# Patient Record
(demographics unavailable — no encounter records)

---

## 2024-11-12 NOTE — PLAN
[FreeTextEntry1] : Differential diagnosis, work up ,management and evaluation of above mentioned concerns extensively reviewed Extensive review of above mentioned concerns. All questions and concerns addressed, patient expressed understanding. Encouraged to contact the office with any questions or concerns.   .

## 2024-11-12 NOTE — HISTORY OF PRESENT ILLNESS
[FreeTextEntry1] : menopausal disorder episodic dull pelvic pain for 3-4 mos-not aggrav or alleviated by anything specific--lasting not very long.Spont resolvesTAH/USO in  by Dr. Doyle Sr for large myomata Currently not in pain P1, [Mammogramdate] : 2024 [ColonoscopyDate] : 2022 [Currently Active] : currently active

## 2024-11-12 NOTE — PHYSICAL EXAM
[Chaperone Present] : A chaperone was present in the examining room during all aspects of the physical examination [FreeTextEntry2] : Kimmy Briones [Appropriately responsive] : appropriately responsive [Alert] : alert [No Acute Distress] : no acute distress [No Lymphadenopathy] : no lymphadenopathy [Soft] : soft [Non-tender] : non-tender [Non-distended] : non-distended [No HSM] : No HSM [No Lesions] : no lesions [No Mass] : no mass [Oriented x3] : oriented x3 [Examination Of The Breasts] : a normal appearance [No Masses] : no breast masses were palpable [Labia Majora] : normal [Labia Minora] : normal [Normal] : normal [Absent] : absent [Uterine Adnexae] : non-palpable

## 2024-11-15 NOTE — IMAGING
[de-identified] : LSPINE Inspection: No rash or ecchymosis Palpation: No tenderness to palpation or spasm in bilateral thoracic and lumbar paraspinal musculature, no SI joint tenderness to palpation ROM: Full with no pain Strength: 5/5 bilateral hip flexors, knee extensors, ankle dorsiflexors, EHL, ankle plantarflexors.  Sensation: Sensation present to light touch bilateral L2-S1 distributions Provocative maneuvers: +SLR RIGHT,  Negative left straight leg raise  Bilateral hips- Palpation: No tenderness to palpation over greater trochanter or IT band ROM: No pain with flexion and internal rotation

## 2024-11-15 NOTE — HISTORY OF PRESENT ILLNESS
[de-identified] : Pt seen by non-spine partners in the past  10/12/2024 [facility] Lumbar MRI  - report noted in chart.  Discs: There is degenerative decreased T2 disc signal from L2-3 through L5-S1 with disc space are most pronounced at L4-5 T12-L1: There is a right paracentral disc herniation without significant central canal stenosis. L1-2: There is no significant posterior disc abnormality, spinal canal or foraminal stenosis. L2-3: There is diffuse diffuse disc bulging and small left foraminal disc herniation resulting in mild bilateral foraminal narrowing L3-4: There is diffuse diffuse disc bulging and facet arthropathy resulting in moderate bilateral foraminal narrowing L4-5: There is diffuse diffuse disc bulging resulting in moderate bilateral foraminal narrowing L5-S1: There is diffuse diffuse disc bulging impinging the descending right S1 nerve roots with moderate left mild right foraminal narrowing Ind. 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 Patient reports pain down the RLE>LLE 70/30 for about 2 months; occasional N/T. Cramping in the posterior thigh to leg to foot.  No bb dysfunction.

## 2024-11-15 NOTE — ASSESSMENT
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  Advised to clear medication with internist or PCP if any concurrent health problem with heart, blood pressure, or GI system exists. Gabapentin- Patient advised of sedating effects, instructed not to drive, operate machinery, or take with other sedating medications. Advised of need to taper on/off medication and risk of abruptly stopping gabapentin.   Entered by Earlene Rdedy acting as scribe. Dr. Luna- The documentation recorded by the scribe accurately reflects the service I personally performed and the decisions made by me.

## 2025-01-30 NOTE — HEALTH RISK ASSESSMENT
[Good] : ~his/her~  mood as  good [No] : In the past 12 months have you used drugs other than those required for medical reasons? No [No falls in past year] : Patient reported no falls in the past year [0] : 2) Feeling down, depressed, or hopeless: Not at all (0) [Never] : Never [NO] : No [Patient reported mammogram was normal] : Patient reported mammogram was normal [Patient reported PAP Smear was normal] : Patient reported PAP Smear was normal [Patient reported bone density results were abnormal] : Patient reported bone density results were abnormal [Patient reported colonoscopy was abnormal] : Patient reported colonoscopy was abnormal [HIV test declined] : HIV test declined [None] : None [Feels Safe at Home] : Feels safe at home [Fully functional (bathing, dressing, toileting, transferring, walking, feeding)] : Fully functional (bathing, dressing, toileting, transferring, walking, feeding) [Fully functional (using the telephone, shopping, preparing meals, housekeeping, doing laundry, using] : Fully functional and needs no help or supervision to perform IADLs (using the telephone, shopping, preparing meals, housekeeping, doing laundry, using transportation, managing medications and managing finances) [Smoke Detector] : smoke detector [Carbon Monoxide Detector] : carbon monoxide detector [Seat Belt] :  uses seat belt [Sunscreen] : uses sunscreen [With Patient/Caregiver] : , with patient/caregiver [FreeTextEntry1] : Check up  [de-identified] : ORTHOGYN [[id number]] : 0 [Change in mental status noted] : No change in mental status noted [Reports changes in hearing] : Reports no changes in hearing [Reports changes in vision] : Reports no changes in vision [Reports changes in dental health] : Reports no changes in dental health [MammogramDate] : 01/24 [PapSmearDate] : 11/18 [BoneDensityDate] : 09/22 [ColonoscopyDate] : 05/22 [HepatitisCComments] : Negative [HepatitisCDate] : 05/18 [AdvancecareDate] : 01/25

## 2025-01-30 NOTE — HISTORY OF PRESENT ILLNESS
[de-identified] : 68 year old female patient with history of stable Hypercholesterolemia, Vitamin D Deficiency, Chronic Migraine, Vertigo, history as stated, presented for an annual preventative examination.

## 2025-01-30 NOTE — ASSESSMENT
[FreeTextEntry1] : 68 year old female found to have stable Hypercholesterolemia, Vitamin D Deficiency, Chronic Migraine, Vertigo, with the current prescription regimen as recommended, diet and lifestyle modifications, as counseled. Prior results reviewed, interpreted and discussed with the patient during today's examination, as appropriate. Follow up, treatment plan and tests, as ordered.  Having a persistently abnormal lymphocyte count can be associated with various benign and abnormal conditions, one of the latter being monoclonal B-cell lymphocytosis (MBL). MBL is a precursor state of chronic lymphocytic leukemia (CLL), HEMEONC F/U for further assessment and possible appropriate surveillance, as counseled.   EKG: Sinus Rhythm @ 65 BPM. Known LAD, no new acute ST-T changes noted.

## 2025-01-30 NOTE — HEALTH RISK ASSESSMENT
[Good] : ~his/her~  mood as  good [No] : In the past 12 months have you used drugs other than those required for medical reasons? No [No falls in past year] : Patient reported no falls in the past year [0] : 2) Feeling down, depressed, or hopeless: Not at all (0) [Never] : Never [NO] : No [Patient reported mammogram was normal] : Patient reported mammogram was normal [Patient reported PAP Smear was normal] : Patient reported PAP Smear was normal [Patient reported bone density results were abnormal] : Patient reported bone density results were abnormal [Patient reported colonoscopy was abnormal] : Patient reported colonoscopy was abnormal [HIV test declined] : HIV test declined [None] : None [Feels Safe at Home] : Feels safe at home [Fully functional (bathing, dressing, toileting, transferring, walking, feeding)] : Fully functional (bathing, dressing, toileting, transferring, walking, feeding) [Fully functional (using the telephone, shopping, preparing meals, housekeeping, doing laundry, using] : Fully functional and needs no help or supervision to perform IADLs (using the telephone, shopping, preparing meals, housekeeping, doing laundry, using transportation, managing medications and managing finances) [Smoke Detector] : smoke detector [Carbon Monoxide Detector] : carbon monoxide detector [Seat Belt] :  uses seat belt [Sunscreen] : uses sunscreen [With Patient/Caregiver] : , with patient/caregiver [FreeTextEntry1] : Check up  [de-identified] : ORTHOGYN [[id number]] : 0 [Change in mental status noted] : No change in mental status noted [Reports changes in hearing] : Reports no changes in hearing [Reports changes in vision] : Reports no changes in vision [Reports changes in dental health] : Reports no changes in dental health [MammogramDate] : 01/24 [PapSmearDate] : 11/18 [BoneDensityDate] : 09/22 [ColonoscopyDate] : 05/22 [HepatitisCComments] : Negative [HepatitisCDate] : 05/18 [AdvancecareDate] : 01/25

## 2025-01-30 NOTE — HISTORY OF PRESENT ILLNESS
[de-identified] : 68 year old female patient with history of stable Hypercholesterolemia, Vitamin D Deficiency, Chronic Migraine, Vertigo, history as stated, presented for an annual preventative examination.

## 2025-02-04 NOTE — HISTORY OF PRESENT ILLNESS
[de-identified] : Pt seen by non-spine partners in the past  10/12/2024 [facility] Lumbar MRI  - report noted in chart.  Discs: There is degenerative decreased T2 disc signal from L2-3 through L5-S1 with disc space are most pronounced at L4-5 T12-L1: There is a right paracentral disc herniation without significant central canal stenosis. L1-2: There is no significant posterior disc abnormality, spinal canal or foraminal stenosis. L2-3: There is diffuse diffuse disc bulging and small left foraminal disc herniation resulting in mild bilateral foraminal narrowing L3-4: There is diffuse diffuse disc bulging and facet arthropathy resulting in moderate bilateral foraminal narrowing L4-5: There is diffuse diffuse disc bulging resulting in moderate bilateral foraminal narrowing L5-S1: There is diffuse diffuse disc bulging impinging the descending right S1 nerve roots with moderate left mild right foraminal narrowing Ind. 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 Patient reports pain down the RLE>LLE 70/30 for about 2 months; occasional N/T. Cramping in the posterior thigh to leg to foot.  No bb dysfunction. 2/4/25- following up for lower back pain improves (70%) with PT.

## 2025-02-04 NOTE — ASSESSMENT
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 L4/5 bulge with mild stenosis; R paracentral HNP L5/S1 encroaching on traversing root  following up for lumbar back pain, conservative measures with great relief.   Continue PT Will discuss Pain management Discussed indications for surgical intervention f/up 8 weeks or sooner if worsening symptoms   NSAIDs otc  Patient warned of risk of medication to GI tract, increased blood pressure, cardiac risk, and risk of fluid retention.  Advised to clear medication with internist or PCP if any concurrent health problem with heart, blood pressure, or GI system exists. Gabapentin- Patient advised of sedating effects, instructed not to drive, operate machinery, or take with other sedating medications. Advised of need to taper on/off medication and risk of abruptly stopping gabapentin.

## 2025-02-04 NOTE — IMAGING
[de-identified] : LSPINE Inspection: No rash or ecchymosis Palpation: No tenderness to palpation or spasm in bilateral thoracic and lumbar paraspinal musculature, no SI joint tenderness to palpation ROM: Full with no pain Strength: 5/5 bilateral hip flexors, knee extensors, ankle dorsiflexors, EHL, ankle plantarflexors.  Sensation: Sensation present to light touch bilateral L2-S1 distributions Provocative maneuvers: +SLR RIGHT,  Negative left straight leg raise  Bilateral hips- Palpation: No tenderness to palpation over greater trochanter or IT band ROM: No pain with flexion and internal rotation

## 2025-02-06 NOTE — IMAGING
[de-identified] : Constitutional: well developed and well nourished, able to communicate Cardiovascular: Peripheral vascular exam is grossly normal Neurologic: Alert and oriented, no acute distress. Skin: normal skin with no ulcers, rashes, or lesions Pulmonary: No respiratory distress, breathing comfortably on room air Lymphatics: No obvious lymphadenopathy or lymphedema in areas examined  LEFT KNEE EXAM Alignment: Neutral Effusion: None Atrophy: None                                                  Stable to Varus/valgus stress Posterior Drawer Test: negative Anterior Drawer Test: Negative Knee Extension/Flexion: 0 / 120  Medial/lateral compartments Medial joint line: POS Tenderness Lateral joint line: No Tenderness Pascale test: negative  Patellofemoral joint Medial patellar facet: no tenderness Patellar grind: Negative  Tendons: Pes Anserine: No tenderness Gerdys Tubercle/ IT Band: No tenderness Quadriceps Tendon: No Tenderness patellar tendon: no Tenderness Tibial tubercle: not tenderness Calf: no Tenderness  Neurovascular exam Muscle strength: 5/5 Sensation to light touch: intact Distal pulses: 2+  [id number] Xrays of the Left Knee were taken demonstrating mild OA

## 2025-02-06 NOTE — ASSESSMENT
[FreeTextEntry1] : 68 year F with left knee OA that is mild - physical therapy and NSAIDs (mobic) was prescribed  - Return in 6 weeks for follow up

## 2025-02-06 NOTE — HISTORY OF PRESENT ILLNESS
[de-identified] : 02/06/2025 ZENY SOUTH 68 year female Here for evaluation of Left knee. patient states she has been having pain in left knee for the past week. recently pain as become unbearable making it difficult  to walk. see by DR lemus , +lukas

## 2025-03-20 NOTE — HISTORY OF PRESENT ILLNESS
[de-identified] : 02/06/2025 ZENY SOUTH 68 year female Here for evaluation of Left knee. patient states she has been having pain in left knee for the past week. recently pain as become unbearable making it difficult  to walk. see by DR lemus , +tylenol  3/20/25  patient here for foll up left knee. states improvement since last visit, still has mild pain somedays. continues doing her own PT at home
Ambulatory

## 2025-03-20 NOTE — IMAGING
[de-identified] : Constitutional: well developed and well nourished, able to communicate Cardiovascular: Peripheral vascular exam is grossly normal Neurologic: Alert and oriented, no acute distress. Skin: normal skin with no ulcers, rashes, or lesions Pulmonary: No respiratory distress, breathing comfortably on room air Lymphatics: No obvious lymphadenopathy or lymphedema in areas examined  HARRIS KNEE EXAM Alignment: Neutral Effusion: None Atrophy: None                                                  Stable to Varus/valgus stress Posterior Drawer Test: negative Anterior Drawer Test: Negative Knee Extension/Flexion: 0 / 120  Medial/lateral compartments Medial joint line: POS Tenderness Lateral joint line: No Tenderness Pascale test: negative  Patellofemoral joint Medial patellar facet: no tenderness Patellar grind: Negative  Tendons: Pes Anserine: No tenderness Gerdys Tubercle/ IT Band: No tenderness Quadriceps Tendon: No Tenderness patellar tendon: no Tenderness Tibial tubercle: not tenderness Calf: no Tenderness  Neurovascular exam Muscle strength: 5/5 Sensation to light touch: intact Distal pulses: 2+  [id number] Xrays of the HARRIS Knee were taken demonstrating mild OA

## 2025-03-20 NOTE — ASSESSMENT
[FreeTextEntry1] : 68 year F with HARRIS knee OA that is mild - physical therapy and NSAIDs (mobic) was prescribed  - Return in 6 weeks for follow up   03/20/2025 doing well with physical therapy transitioned to HEP but continues to have locking and giving away Mobic PRN Right knee MRI and fu after completion, has failed conservative tx options consider CSI in the future vs gels

## 2025-03-28 NOTE — HISTORY OF PRESENT ILLNESS
[Gradual] : gradual [7] : 7 [8] : 8 [Dull/Aching] : dull/aching [Constant] : constant [Ice] : ice [Retired] : Work status: retired [] : no [FreeTextEntry1] : right wrist  [FreeTextEntry5] : 3/24 lump volar r wrist, also pain lat r wrist [FreeTextEntry9] : tylenolo helps a little [de-identified] : usage

## 2025-03-28 NOTE — PHYSICAL EXAM
[Volar Wrist] : volar wrist [First Dorsal Compartment] : first dorsal compartment [Right] : right wrist [No acute displaced fracture or dislocation] : No acute displaced fracture or dislocation [de-identified] : 5mm soft

## 2025-03-28 NOTE — HISTORY OF PRESENT ILLNESS
[Gradual] : gradual [7] : 7 [8] : 8 [Dull/Aching] : dull/aching [Constant] : constant [Ice] : ice [Retired] : Work status: retired [] : no [FreeTextEntry1] : right wrist  [FreeTextEntry5] : 3/24 lump volar r wrist, also pain lat r wrist [FreeTextEntry9] : tylenolo helps a little [de-identified] : usage

## 2025-03-28 NOTE — PROCEDURE
[FreeTextEntry3] : Procedure Name: Tendon Sheath r dc1 Injection: Celestone, Lidocaine, Marcaine, Evaluation and Guidance Ultrasound Injection was performed because of pain and inflammation. Anesthesia: ethyl chloride sprayed topically.  Celestone: 2 cc.  Lidocaine: 2 cc.  Marcaine: 2 cc.  Medication was injected. After verbal consent using sterile preparation and technique. The risks, benefits, and alternatives to cortisone injection were explained in full to the patient. Risks outlined include but are not limited to infection, sepsis, bleeding, scarring, skin discoloration, temporary increase in pain, syncopal episode, failure to resolve symptoms, allergic reaction, symptom recurrence, and elevation of blood sugar in diabetics. Patient understood the risks. All questions were answered. After discussion of options, patient requested an injection. Oral informed consent was obtained and sterile prep was done of the injection site. Sterile technique was utilized for the procedure including the preparation of the solutions used for the injection. Patient tolerated the procedure well. Advised to ice the injection site this evening. Prep with betadine locally to site. Sterile technique used.  Ultrasound guided injection was performed. Visualization of the needle and placement of injection was performed without complication.

## 2025-03-28 NOTE — PHYSICAL EXAM
[Volar Wrist] : volar wrist [First Dorsal Compartment] : first dorsal compartment [Right] : right wrist [No acute displaced fracture or dislocation] : No acute displaced fracture or dislocation [de-identified] : 5mm soft

## 2025-04-01 NOTE — HISTORY OF PRESENT ILLNESS
[de-identified] : Pt seen by non-spine partners in the past  10/12/2024 [facility] Lumbar MRI  - report noted in chart.  Discs: There is degenerative decreased T2 disc signal from L2-3 through L5-S1 with disc space are most pronounced at L4-5 T12-L1: There is a right paracentral disc herniation without significant central canal stenosis. L1-2: There is no significant posterior disc abnormality, spinal canal or foraminal stenosis. L2-3: There is diffuse diffuse disc bulging and small left foraminal disc herniation resulting in mild bilateral foraminal narrowing L3-4: There is diffuse diffuse disc bulging and facet arthropathy resulting in moderate bilateral foraminal narrowing L4-5: There is diffuse diffuse disc bulging resulting in moderate bilateral foraminal narrowing L5-S1: There is diffuse diffuse disc bulging impinging the descending right S1 nerve roots with moderate left mild right foraminal narrowing Ind. 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 Patient reports pain down the RLE>LLE 70/30 for about 2 months; occasional N/T. Cramping in the posterior thigh to leg to foot.  No bb dysfunction. 2/4/25- following up for lower back pain improves (70%) with PT.  4/1/25- intermittent pain, stopped PT. LLE radicular pain is lateral while RLE is posterior.

## 2025-04-01 NOTE — ASSESSMENT
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 L4/5 bulge with mild stenosis; R paracentral HNP L5/S1 encroaching on traversing root Discussed indication for surgery  Discussed pain c/s rx given for massage and acupuncture f/u 2 months

## 2025-04-01 NOTE — IMAGING
[de-identified] : LSPINE Palpation: No tenderness to palpation or spasm in bilateral thoracic and lumbar paraspinal musculature, no SI joint tenderness to palpation ROM: Full with no pain Strength: 5/5 bilateral hip flexors, knee extensors, ankle dorsiflexors, EHL, ankle plantarflexors.  Sensation: Sensation present to light touch bilateral L2-S1 distributions Provocative maneuvers: +SLR B/L  Bilateral hips- Palpation: No tenderness to palpation over greater trochanter or IT band ROM: No pain with flexion and internal rotation

## 2025-04-03 NOTE — ASSESSMENT
[FreeTextEntry1] : 68 year F with HARRIS knee OA that is mild - physical therapy and NSAIDs (mobic) was prescribed  - Return in 6 weeks for follow up   03/20/2025 doing well with physical therapy transitioned to HEP but continues to have locking and giving away Mobic PRN Right knee MRI and fu after completion, has failed conservative tx options consider CSI in the future vs gels  04/03/2025 MRI with lateral defect condyle and PF OA. MDP first consider CSI in the future vs gels

## 2025-04-03 NOTE — HISTORY OF PRESENT ILLNESS
[de-identified] : 02/06/2025 ZENY SOUTH 68 year female Here for evaluation of Left knee. patient states she has been having pain in left knee for the past week. recently pain as become unbearable making it difficult  to walk. see by DR lemus , +tylenol  3/20/25  patient here for foll up left knee. states improvement since last visit, still has mild pain somedays. continues doing her own PT at home  04/03/2025 Patient is Here today for follow up MRI results

## 2025-04-03 NOTE — IMAGING
[de-identified] : Constitutional: well developed and well nourished, able to communicate Cardiovascular: Peripheral vascular exam is grossly normal Neurologic: Alert and oriented, no acute distress. Skin: normal skin with no ulcers, rashes, or lesions Pulmonary: No respiratory distress, breathing comfortably on room air Lymphatics: No obvious lymphadenopathy or lymphedema in areas examined  HARRIS KNEE EXAM Alignment: Neutral Effusion: None Atrophy: None                                                  Stable to Varus/valgus stress Posterior Drawer Test: negative Anterior Drawer Test: Negative Knee Extension/Flexion: 0 / 120  Medial/lateral compartments Medial joint line: POS Tenderness Lateral joint line: No Tenderness Pascale test: negative  Patellofemoral joint Medial patellar facet: no tenderness Patellar grind: Negative  Tendons: Pes Anserine: No tenderness Gerdys Tubercle/ IT Band: No tenderness Quadriceps Tendon: No Tenderness patellar tendon: no Tenderness Tibial tubercle: not tenderness Calf: no Tenderness  Neurovascular exam Muscle strength: 5/5 Sensation to light touch: intact Distal pulses: 2+  [id number] Xrays of the HARRIS Knee were taken demonstrating mild OA  MRI R knee full thickness Cartilage defect posterior weightbearing lateral femur with no edema or fracture medial meniscal capsular injury PF cartilage loss

## 2025-04-15 NOTE — HISTORY OF PRESENT ILLNESS
[de-identified] : This is a 68 year old woman who was refered to hematology for the evaluation of a lymphocytosis.  Patient had seen Dr. calderón January 28th. Was feeling fine at the time, patient felt well, Was not sick from a viral illness or going through anything unusual.  WBC count that day was 7.96 with a lymphocyte count 3740 Diff 47% which was marginally higher than typical refference range.     Denies body pains or aches at the time though she usually has some back pain and arthritis in the knees.   Dr. Calderón had already run a flow cytometry that demonstrated no diagnostic abnormalities. A B cell clone was not detected. Peripheral blood:      - The lymphocyte immunophenotypic findings show no diagnostic abnormalities.      - The myeloid immunophenotypic findings show normal myeloid granularity, no increase in myeloid immaturity, and normal myeloid antigen maturation pattern.  C urrent medications include Crestor, and on Nurtec for migraines,   Patient is on cipro now after seeing an urgent care.  Was thought to have a UTI, was prescribed Cipro for a possible UTI.

## 2025-04-15 NOTE — PHYSICAL EXAM
[Normal] : full range of motion and no deformities appreciated [de-identified] : modest pain on deep palpation in the left lower quadrant.   [de-identified] : All the bruising she noted is no longer visible.

## 2025-04-15 NOTE — ASSESSMENT
[FreeTextEntry1] : This is a 68 year old woman with a history of a mild lymphocytosis where a flow cytometry was already run which did not demonstrate the presence of a lymphocytic clone. Suspect the lymphocyte count and intermittently increased lymphocyte % was high periodically because of an underling [place] null status which would decreased the observed % of neutrophils. Given that patient was not actually leukopenic, just a lymphocytosis, patient probably had a lipocyte count that was proportionality higher than usual, and given a Hill null associated neutropenia, this just increased the Lymphocyte % rather than really causing much of a neutropenia.    Addendum 4/15/25 Patient WBC count 9.93k/ul, Hg 14g/dl platelets normal at 241. Normal CBC.  Differential still shows a normal ANC 5000+ with a mild lymphocytosis 3600+, normal diff, lymphocytes 36.5%, no hematologic abnormalities noted. Patient already underwent a flow cytometry that did not  demonstrate A B-cell clone.   B12 1447 Folic acid > 20.0ng/ml.    NO hematologic pathology noted, patient can return should any new cytopenia's or leukocytosis arise. Would not consider this mildly increase absolute lymphocyte count

## 2025-04-15 NOTE — PHYSICAL EXAM
[Normal] : full range of motion and no deformities appreciated [de-identified] : modest pain on deep palpation in the left lower quadrant.   [de-identified] : All the bruising she noted is no longer visible.

## 2025-04-15 NOTE — HISTORY OF PRESENT ILLNESS
[de-identified] : This is a 68 year old woman who was refered to hematology for the evaluation of a lymphocytosis.  Patient had seen Dr. calderón January 28th. Was feeling fine at the time, patient felt well, Was not sick from a viral illness or going through anything unusual.  WBC count that day was 7.96 with a lymphocyte count 3740 Diff 47% which was marginally higher than typical refference range.     Denies body pains or aches at the time though she usually has some back pain and arthritis in the knees.   Dr. Calderón had already run a flow cytometry that demonstrated no diagnostic abnormalities. A B cell clone was not detected. Peripheral blood:      - The lymphocyte immunophenotypic findings show no diagnostic abnormalities.      - The myeloid immunophenotypic findings show normal myeloid granularity, no increase in myeloid immaturity, and normal myeloid antigen maturation pattern.  C urrent medications include Crestor, and on Nurtec for migraines,   Patient is on cipro now after seeing an urgent care.  Was thought to have a UTI, was prescribed Cipro for a possible UTI.

## 2025-06-04 NOTE — HEALTH RISK ASSESSMENT
[No] : In the past 12 months have you used drugs other than those required for medical reasons? No [No falls in past year] : Patient reported no falls in the past year [0] : 2) Feeling down, depressed, or hopeless: Not at all (0) [de-identified] : NEURO/HEMEONC/ORTHO None [[id number]] : 0 [Never] : Never

## 2025-06-04 NOTE — HEALTH RISK ASSESSMENT
[No] : In the past 12 months have you used drugs other than those required for medical reasons? No [No falls in past year] : Patient reported no falls in the past year [0] : 2) Feeling down, depressed, or hopeless: Not at all (0) [de-identified] : NEURO/HEMEONC/ORTHO None [[id number]] : 0 [Never] : Never

## 2025-06-04 NOTE — ASSESSMENT
[FreeTextEntry1] : 68 year old female found to have stable Hypercholesterolemia, Vitamin D Deficiency, Chronic Migraine, Elevated Hemoglobin A1c, Vertigo, with the current prescription regimen as recommended, diet and lifestyle modifications, as counseled. Prior results reviewed, interpreted and discussed with the patient during today's examination, as appropriate. Follow up, treatment plan and tests, as ordered.   Total time spent:: 25 minutes Including: Preparation prior to visit - Reviewing prior record, results of tests and Consultation Reports as applicable Conducting an appropriate H & P during today's encounter Appropriate orders for tests, medications and procedures, as applicable Counseling patient Note completion

## 2025-06-04 NOTE — REVIEW OF SYSTEMS
[TextEntry] : CARDIOVASCULAR: Negative RESPIRATORY: Negative GASTROINTESTINAL: Negative NEUROLOGICAL: Negative